# Patient Record
Sex: MALE | Race: WHITE
[De-identification: names, ages, dates, MRNs, and addresses within clinical notes are randomized per-mention and may not be internally consistent; named-entity substitution may affect disease eponyms.]

---

## 2017-07-25 ENCOUNTER — HOSPITAL ENCOUNTER (EMERGENCY)
Dept: HOSPITAL 74 - JER | Age: 37
Discharge: HOME | End: 2017-07-25
Payer: COMMERCIAL

## 2017-07-25 VITALS — TEMPERATURE: 98 F

## 2017-07-25 VITALS — HEART RATE: 72 BPM | DIASTOLIC BLOOD PRESSURE: 78 MMHG | SYSTOLIC BLOOD PRESSURE: 145 MMHG

## 2017-07-25 VITALS — BODY MASS INDEX: 27.2 KG/M2

## 2017-07-25 DIAGNOSIS — R10.11: Primary | ICD-10-CM

## 2017-07-25 DIAGNOSIS — R19.7: ICD-10-CM

## 2017-07-25 LAB
ALBUMIN SERPL-MCNC: 3.8 G/DL (ref 3.4–5)
ALP SERPL-CCNC: 63 U/L (ref 45–117)
ALT SERPL-CCNC: 38 U/L (ref 12–78)
ANION GAP SERPL CALC-SCNC: 6 MMOL/L (ref 8–16)
APPEARANCE UR: CLEAR
AST SERPL-CCNC: 38 U/L (ref 15–37)
BASOPHILS # BLD: 0.6 % (ref 0–2)
BILIRUB SERPL-MCNC: 0.4 MG/DL (ref 0.2–1)
BILIRUB UR STRIP.AUTO-MCNC: NEGATIVE MG/DL
CALCIUM SERPL-MCNC: 9.3 MG/DL (ref 8.5–10.1)
CO2 SERPL-SCNC: 28 MMOL/L (ref 21–32)
COLOR UR: YELLOW
CREAT SERPL-MCNC: 1.2 MG/DL (ref 0.7–1.3)
DEPRECATED RDW RBC AUTO: 13.2 % (ref 11.9–15.9)
EOSINOPHIL # BLD: 0.8 % (ref 0–4.5)
GLUCOSE SERPL-MCNC: 91 MG/DL (ref 74–106)
KETONES UR QL STRIP: NEGATIVE
LEUKOCYTE ESTERASE UR QL STRIP.AUTO: NEGATIVE
MAGNESIUM SERPL-MCNC: 2.1 MG/DL (ref 1.8–2.4)
MCH RBC QN AUTO: 31.6 PG (ref 25.7–33.7)
MCHC RBC AUTO-ENTMCNC: 34 G/DL (ref 32–35.9)
MCV RBC: 93 FL (ref 80–96)
MUCOUS THREADS URNS QL MICRO: (no result)
NEUTROPHILS # BLD: 63.8 % (ref 42.8–82.8)
NITRITE UR QL STRIP: NEGATIVE
PH UR: 6 [PH] (ref 5–8)
PLATELET # BLD AUTO: 235 K/MM3 (ref 134–434)
PMV BLD: 8.2 FL (ref 7.5–11.1)
PROT SERPL-MCNC: 7.6 G/DL (ref 6.4–8.2)
PROT UR QL STRIP: (no result)
PROT UR QL STRIP: NEGATIVE
RBC # BLD AUTO: <1 /HPF (ref 0–3)
RBC # UR STRIP: NEGATIVE /UL
SP GR UR: >= 1.03 (ref 1–1.02)
UROBILINOGEN UR STRIP-MCNC: 2 MG/DL (ref 0.2–1)
WBC # BLD AUTO: 6.2 K/MM3 (ref 4–10)
WBC # UR AUTO: <1 /HPF (ref 3–5)

## 2017-07-25 PROCEDURE — 3E0333Z INTRODUCTION OF ANTI-INFLAMMATORY INTO PERIPHERAL VEIN, PERCUTANEOUS APPROACH: ICD-10-PCS

## 2017-07-25 PROCEDURE — 3E033GC INTRODUCTION OF OTHER THERAPEUTIC SUBSTANCE INTO PERIPHERAL VEIN, PERCUTANEOUS APPROACH: ICD-10-PCS

## 2017-07-25 NOTE — PDOC
*Physical Exam





- Vital Signs


 Last Vital Signs











Temp Pulse Resp BP Pulse Ox


 


 98 F   88   18   155/85   98 


 


 07/25/17 11:55  07/25/17 11:55  07/25/17 11:55  07/25/17 11:55  07/25/17 11:55














ED Treatment Course





- LABORATORY


CBC & Chemistry Diagram: 


 07/25/17 13:00





 07/25/17 13:00





- Medications


Given in the ED: 


ED Medications














Discontinued Medications














Generic Name Dose Route Start Last Admin





  Trade Name Wallace  PRN Reason Stop Dose Admin


 


Pantoprazole Sodium 40 mg/  100 mls @ 200 mls/hr 07/25/17 12:41 07/25/17 13:19





  Sodium Chloride  IVPB 07/25/17 13:10  200 mls/hr





  ONCE ONE   Administration


 


Ketorolac Tromethamine  30 mg 07/25/17 12:41 07/25/17 13:19





  Toradol Injection -  IVPUSH 07/25/17 12:42  30 mg





  ONCE STA   Administration














Medical Decision Making





- Medical Decision Making





07/25/17 13:30


Patient seen and evaluated with the nurse practitioner. I agree with the 

overall evaluation, assessment, and management with the following summary of 

visit:





37-year-old male presents with abdominal discomfort and diarrhea.





Agree with exam, agree with workup including labs, IV fluids, antacids.


Reassess and dispo

## 2017-07-25 NOTE — PDOC
History of Present Illness





- General


Chief Complaint: Pain


Stated Complaint: ABD PAIN


Time Seen by Provider: 07/25/17 12:11


History Source: Patient


Exam Limitations: No Limitations





- History of Present Illness


Travel History: No


Initial Comments: 


07/25/17 12:57


37-year-old male presents to the ED with complaints of intermittent diarrhea 

for the past 2 days associated with epigastric cramping without fever, chills, 

nausea, weakness, dizziness, rectal bleeding, or back pain. Patient states 

symptoms are worsened after eating and denies history of diverticulitis, recent 

travel, recent illness. Pt states diarrhea has decreased over the past 24 hours 

but since still continues, he decided to seek medical care.








Timing/Duration: reports: intermittent


Quality: reports: moderate, cramping


Abdominal Pain Onset Location: reports: epigastric


Pain Radiation: reports: no radiation


Aggravating Factors: improves with: Eating


Alleviating Factors: improves with: None





Past History





- Travel


Traveled outside of the country in the last 30 days: No


Close contact w/someone who was outside of country & ill: No





- Past Medical History


Allergies/Adverse Reactions: 


 Allergies











Allergy/AdvReac Type Severity Reaction Status Date / Time


 


No Known Allergies Allergy   Verified 07/25/17 11:59











Home Medications: 


Ambulatory Orders





NK [No Known Home Medication]  05/01/16 








Suicide Attempt (Hx): No





- Immunization History


Immunization Up to Date: Yes





- Psycho/Social/Smoking Cessation Hx


Anxiety: No


Suicidal Ideation: No


Smoking History: Never smoked


Information on smoking cessation initiated: No


Hx Alcohol Use: No


Drug/Substance Use Hx: No


Substance Use Type: None


Patient Lives Alone: No


Lives with/in: spouse/SO





Abd/GI Specific PMHX





- Complaint Specific PMHX


Colitis: No


Diverticulitis: No


GERD: No


GI Ulcer Disease: No





**Review of Systems





- Review of Systems


Able to Perform ROS?: Yes


Constitutional: No: Symptoms Reported


HEENTM: No: Symptoms Reported


Respiratory: Yes: Cough, SOB with Exertion


Cardiac (ROS): No: Symptoms Reported, Chest Pain, Chest Tightness


ABD/GI: No: Symptoms Reported


Musculoskeletal: No: Symptoms Reported


Integumentary: No: Symptoms Reported


Neurological: No: Symptoms reported


Hematologic/Lymphatic: No: Symptoms Reported





*Physical Exam





- Vital Signs


 Last Vital Signs











Temp Pulse Resp BP Pulse Ox


 


 98 F   88   18   155/85   98 


 


 07/25/17 11:55  07/25/17 11:55  07/25/17 11:55  07/25/17 11:55  07/25/17 11:55














- Physical Exam


General Appearance: Yes: Nourished, Appropriately Dressed.  No: Apparent 

Distress


HEENT: negative: Pale Conjunctivae


Neck: positive: Supple


Respiratory/Chest: positive: Lungs Clear, Normal Breath Sounds.  negative: 

Respiratory Distress, Accessory Muscle Use


Cardiovascular: positive: Regular Rhythm, Regular Rate.  negative: Murmur


Gastrointestinal/Abdominal: positive: Soft, Tenderness (epigastric right upper 

quadrant. Negative [place])


Musculoskeletal: negative: CVA Tenderness


Integumentary: positive: Normal Color, Warm, Moist


Neurologic: positive: Motor Strength 5/5 (ambulatory)





ED Treatment Course





- LABORATORY


CBC & Chemistry Diagram: 


 07/25/17 13:00





 07/25/17 13:00





Medical Decision Making





- Medical Decision Making


07/25/17 13:08


Patient complains of intermittent diarrhea for the past 2 days aggravated with 

eating. Patient has no other complaints at this time except for mild abdominal 

cramping to the epigastric region. Patient on exam and tenderness to the 

epigastric and right upper quadrant area. Patient be ordered for labs including 

a lipase, ordered for Protonix IV fluids and Toradol. Patient also started 

drinking contrast if the need for CT will be required.





07/25/17 15:00


 Laboratory Tests











  07/25/17





  12:41


 


Urine Protein  2+ H


 


Urine Nitrite  Negative


 


Urine Bilirubin  Negative


 


Ur Leukocyte Esterase  Negative


 


Urine WBC  <1














07/25/17 15:01


 Laboratory Tests











  07/25/17 07/25/17





  13:00 13:00


 


WBC  6.2 


 


Hgb  14.9 


 


Hct  43.7 


 


Neutrophils %  63.8 


 


Sodium   141


 


Potassium   4.6


 


Chloride   107


 


Carbon Dioxide   28


 


Anion Gap   6 L


 


BUN   15


 


Creatinine   1.2


 


Random Glucose   91


 


Calcium   9.3


 


Magnesium   2.1


 


Total Bilirubin   0.4


 


AST   38 H


 


ALT   38


 


Lipase   157








Patient states feeling better after receiving the above medication. Patient be 

discharged home with recommendations to take over-the-counter Pepcid and to eat 

stool binding foods such as starches and bananas





*DC/Admit/Observation/Transfer


Diagnosis at time of Disposition: 


 Epigastric pain





Diarrhea


Qualifiers:


 Diarrhea type: unspecified type Qualified Code(s): R19.7 - Diarrhea, 

unspecified





- Discharge Dispostion


Disposition: HOME


Condition at time of disposition: Improved





- Referrals


Referrals: 


Caridad Portillo [Primary Care Provider] - 





- Patient Instructions


Printed Discharge Instructions:  DI for Diarrhea and Traveler's Diarrhea -- 

Adult, DI for Epigastric Pain


Additional Instructions: 


take over-the-counter Pepcid and to eat stool binding foods such as starches 

and bananas.

## 2018-02-20 ENCOUNTER — HOSPITAL ENCOUNTER (EMERGENCY)
Dept: HOSPITAL 74 - JERFT | Age: 38
Discharge: HOME | End: 2018-02-20
Payer: COMMERCIAL

## 2018-02-20 VITALS — DIASTOLIC BLOOD PRESSURE: 92 MMHG | HEART RATE: 74 BPM | SYSTOLIC BLOOD PRESSURE: 136 MMHG | TEMPERATURE: 97.9 F

## 2018-02-20 VITALS — BODY MASS INDEX: 27.2 KG/M2

## 2018-02-20 DIAGNOSIS — R51: Primary | ICD-10-CM

## 2018-02-20 PROCEDURE — 3E0233Z INTRODUCTION OF ANTI-INFLAMMATORY INTO MUSCLE, PERCUTANEOUS APPROACH: ICD-10-PCS | Performed by: NURSE PRACTITIONER

## 2018-02-20 NOTE — PDOC
Rapid Medical Evaluation


Chief Complaint: Headache


Time Seen by Provider: 02/20/18 16:49


Medical Evaluation: 


 Allergies











Allergy/AdvReac Type Severity Reaction Status Date / Time


 


No Known Allergies Allergy   Verified 02/20/18 16:49











02/20/18 16:50


I have performed a brief in-person evaluation of this patient.





The patient presents with a chief complaint of: frontal headache with diplopia 

x2 days- completely relieved with Fiorcet





Pertinent physical exam findings: VSS CN2-12 grossly intact





I have ordered the following: Toradol





The patient will proceed to the ED for further evaluation.





**Discharge Disposition





- Diagnosis


 Headache








- Referrals





- Patient Instructions





- Post Discharge Activity

## 2018-05-11 ENCOUNTER — HOSPITAL ENCOUNTER (EMERGENCY)
Dept: HOSPITAL 74 - JERFT | Age: 38
Discharge: HOME | End: 2018-05-11
Payer: COMMERCIAL

## 2018-05-11 VITALS — HEART RATE: 87 BPM | DIASTOLIC BLOOD PRESSURE: 80 MMHG | TEMPERATURE: 98.9 F | SYSTOLIC BLOOD PRESSURE: 104 MMHG

## 2018-05-11 VITALS — BODY MASS INDEX: 26.9 KG/M2

## 2018-05-11 DIAGNOSIS — J40: Primary | ICD-10-CM

## 2018-05-11 DIAGNOSIS — R05: ICD-10-CM

## 2018-05-11 DIAGNOSIS — R51: ICD-10-CM

## 2018-05-11 NOTE — PDOC
History of Present Illness





- General


Chief Complaint: Cold Symptoms


Stated Complaint: HEADACHES


Time Seen by Provider: 05/11/18 10:49





- History of Present Illness


Initial Comments: 


37-year-old male presents for evaluation of headache with associated cough. 

Going on the last 2 days. He has no fevers chills or night sweats nausea 

vomiting. He's had headaches like this in the past area he does have a history 

of migraines and this headache is typical of prior headaches.


05/11/18 12:06








Past History





- Past Medical History


Allergies/Adverse Reactions: 


 Allergies











Allergy/AdvReac Type Severity Reaction Status Date / Time


 


No Known Allergies Allergy   Verified 05/11/18 10:37











Home Medications: 


Ambulatory Orders





Acetaminophen/Caffeine/Butalb [Fioricet -] 1 tab PO TID PRN #15 tablet MDD 3 02/ 20/18 


Dextromethorphan HBr [Robitussin] 15 mg PO HS #20 ml 05/11/18 


Dextromethorphan HBr [Robitussin] 15 mg PO HS #20 ml 05/11/18 








CVA: No


COPD: No


DVT: No





- Immunization History


Immunization Up to Date: Yes





- Suicide/Smoking/Psychosocial Hx


Smoking History: Never smoked


Information on smoking cessation initiated: No


Hx Alcohol Use: No


Drug/Substance Use Hx: No


Substance Use Type: None





**Review of Systems





- Review of Systems


Comments:: 





05/11/18 12:07


GENERAL/CONSTITUTIONAL: [No fever or chills. No weakness. No weight change.]


HEAD, EYES, EARS, NOSE AND THROAT: [No change in vision. No ear pain or 

discharge. No sore throat.]


CARDIOVASCULAR: [No chest pain or shortness of breath.]


RESPIRATORY: [+ cough, NO wheezing, or hemoptysis.]


GASTROINTESTINAL: [No nausea, vomiting, diarrhea or constipation. No rectal 

bleeding.]


GENITOURINARY: [No dysuria, frequency, or change in urination.]


MUSCULOSKELETAL: [No joint or muscle swelling or pain. No neck or back pain.]


SKIN AND BREASTS: [No rash or easy bruising.]


NEUROLOGIC: [+ headache,  NO vertigo, loss of consciousness, or loss of 

sensation.]


PSYCHIATRIC: [No depression or anxiety.]


ENDOCRINE: [No increased thirst. No abnormal weight change.]


HEMATOLOGIC/LYMPHATIC: [No anemia, easy bleeding, or history of blood clots.]


ALLERGIC/IMMUNOLOGIC: [No hives or skin allergy. No latex allergy.]





*Physical Exam





- Vital Signs


 Last Vital Signs











Temp Pulse Resp BP Pulse Ox


 


 98.9 F   87   17   104/80   96 


 


 05/11/18 10:37  05/11/18 10:37  05/11/18 10:37  05/11/18 10:37  05/11/18 10:37














- Physical Exam


Comments: 





05/11/18 12:08


GENERAL: [The patient is awake, alert, and fully oriented, in no acute distress.

]


HEAD: [Normal with no signs of trauma.]


EYES: [Pupils equal, round and reactive to light, extraocular movements intact, 

sclera anicteric, conjunctiva clear.]


ENT: [Ears normal, nares patent, oropharynx clear without exudates.  Moist 

mucous membranes.]


NECK: [Normal range of motion, supple without lymphadenopathy, JVD, or masses.]


LUNGS: [Breath sounds equal, clear to auscultation bilaterally.  No wheezes, 

and no crackles.]


HEART: [Regular rate and rhythm, normal S1 and S2 without murmur, rub or gallop.

]


ABDOMEN: [Soft, nontender, normoactive bowel sounds.  No guarding, no rebound.  

No masses.]


EXTREMITIES: [Normal range of motion, no edema.  No clubbing or cyanosis. No 

cords, erythema, or tenderness.]


NEUROLOGICAL: [Cranial nerves II through XII grossly intact.  Normal speech, 

normal gait.]


PSYCH: [Normal mood, normal affect.]


SKIN: [Warm, Dry, normal turgor, no rashes or lesions noted.]





Medical Decision Making





- Medical Decision Making


This may be a viral bronchitis causing a headache with his cough or 

exacerbation of his pre-existing migraines. He has Fioricet at home which she 

takes. I will give him Robitussin for the cough and close follow-up with his PCP


05/11/18 12:08








*DC/Admit/Observation/Transfer


Diagnosis at time of Disposition: 


 Headache, Bronchitis, Cough








- Discharge Dispostion


Disposition: HOME


Condition at time of disposition: Stable


Decision to Admit order: No





- Referrals


Referrals: 


Caridad Portillo [Primary Care Provider] - 





- Patient Instructions


Printed Discharge Instructions:  DI for Viral Upper Respiratory Infection -- 

Adult


Additional Instructions: 


May continue headache medication as prescribed above also phoned in Robitussin 

for cough follow-up with her primary care provider next day or to return to the 

emergency room if symptoms worsen or go unresolved prior follow-up





- Post Discharge Activity

## 2018-06-15 ENCOUNTER — HOSPITAL ENCOUNTER (EMERGENCY)
Dept: HOSPITAL 74 - JERFT | Age: 38
Discharge: HOME | End: 2018-06-15
Payer: COMMERCIAL

## 2018-06-15 VITALS — TEMPERATURE: 97.9 F | DIASTOLIC BLOOD PRESSURE: 86 MMHG | HEART RATE: 81 BPM | SYSTOLIC BLOOD PRESSURE: 127 MMHG

## 2018-06-15 VITALS — BODY MASS INDEX: 27.4 KG/M2
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
## 2018-06-15 DIAGNOSIS — S76.812A: Primary | ICD-10-CM
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
## 2018-06-15 DIAGNOSIS — S63.681A: ICD-10-CM

## 2018-06-15 PROCEDURE — 2W3JX1Z IMMOBILIZATION OF RIGHT FINGER USING SPLINT: ICD-10-PCS

## 2018-06-15 PROCEDURE — 3E0233Z INTRODUCTION OF ANTI-INFLAMMATORY INTO MUSCLE, PERCUTANEOUS APPROACH: ICD-10-PCS

## 2018-06-15 NOTE — PDOC
History of Present Illness





- General


Chief Complaint: Pain, Acute


Stated Complaint: LEG PAIN


Time Seen by Provider: 06/15/18 09:53


History Source: Patient





- History of Present Illness


Occurred: reports: yesterday


Severity: Yes: moderate


Lower Extremity Pain Location: left: other (thigh and R thumb)


Method of Injury: Yes: sports injury





Past History





- Past Medical History


Allergies/Adverse Reactions: 


 Allergies











Allergy/AdvReac Type Severity Reaction Status Date / Time


 


No Known Allergies Allergy   Verified 06/15/18 09:42











Home Medications: 


Ambulatory Orders





Cyclobenzaprine HCl [Flexeril -] 10 mg PO TID #9 tablet 06/15/18 


Ibuprofen [Motrin -] 2 tab PO Q6H #30 tablet 06/15/18 








CVA: No


COPD: No


DVT: No





- Immunization History


Immunization Up to Date: Yes





- Suicide/Smoking/Psychosocial Hx


Smoking History: Never smoked


Information on smoking cessation initiated: No


Hx Alcohol Use: No


Drug/Substance Use Hx: No


Substance Use Type: None





**Review of Systems





- Review of Systems


Musculoskeletal: Yes: Joint Pain, Joint Swelling





*Physical Exam





- Vital Signs


 Last Vital Signs











Temp Pulse Resp BP Pulse Ox


 


 97.9 F   81   16   127/86   96 


 


 06/15/18 09:43  06/15/18 09:43  06/15/18 09:43  06/15/18 09:43  06/15/18 09:43














- Physical Exam


General Appearance: Yes: Appropriately Dressed, Moderate Distress


HEENT: positive: Normal Voice


Neck: positive: Supple


Respiratory/Chest: negative: Respiratory Distress


Musculoskeletal: positive: Other (ttp to posterior L thigh, no swelling, distal 

phalanx of R thumb w/ swelling, erythema and ttp)


Integumentary: positive: Dry, Warm


Neurologic: positive: Fully Oriented, Alert, Normal Mood/Affect





ED Treatment Course





- RADIOLOGY


Radiology Studies Ordered: 














 Category Date Time Status


 


 FEMUR-LEFT [RAD] [facility]  06/15/18 10:57 Ordered


 


 FINGER(S) RIGHT [RAD] [facility]  06/15/18 10:59 Ordered














- Medications


Given in the ED: 


ED Medications














Discontinued Medications














Generic Name Dose Route Start Last Admin





  Trade Name Freq  PRN Reason Stop Dose Admin


 


Ketorolac Tromethamine  60 mg  06/15/18 10:58  06/15/18 11:02





  Toradol Injection -  IM  06/15/18 10:59  60 mg





  ONCE ONE   Administration





     





     





     





     














Medical Decision Making





- Medical Decision Making





06/15/18 11:13


37-year-old male, no significant history, presents with multiple injuries 

status post basketball yesterday.  Patient complaining of pain to posterior 

aspect of left thigh which started after landing jumpimg in air and landing 

hard on LLE. Suspects might be his hamstring.  Pain sharp and constant and 

worse with weight bearing.  Has not taken anything for pain but wearing an ace 

wrap to extremity. Also complaining of R thumb pain and swelling after a ball 

bounced off finger.





See exam





LLE pain s/p sports injury


M/l strain


-XR 2/2 degree of pain


-pain meds in ED/reassess





R thumb injury


R/o fx


-xray





06/15/18 11:44


Left femur negative for fracture.  Right thumb film with possible minor non-

displaced fracture to base of distal phalanx.  Finger splint paced and hand 

follow-up given.  Patient better with meds in ED, will discharge with pain 

control








*DC/Admit/Observation/Transfer


Diagnosis at time of Disposition: 


Muscle strain of thigh


Qualifiers:


 Encounter type: initial encounter Laterality: left Qualified Code(s): S76.912A 

- Strain of unspecified muscles, fascia and tendons at thigh level, left thigh, 

initial encounter





Thumb sprain


Qualifiers:


 Sprain of finger site: other site Laterality: right 








- Discharge Dispostion


Disposition: HOME


Condition at time of disposition: Improved





- Prescriptions


Prescriptions: 


Cyclobenzaprine HCl [Flexeril -] 10 mg PO TID #9 tablet


Ibuprofen [Motrin -] 2 tab PO Q6H #30 tablet





- Referrals


Referrals: 


Caridad Portillo [Primary Care Provider] - 


Karl Louis MD [Staff Physician] - 





- Patient Instructions


Printed Discharge Instructions:  DI for Finger Sprain, DI for Hamstring Strain


Additional Instructions: 


X-ray of your left thigh did not show a fracture.  There is a possible very 

minor fracture in your thumb.  Treatment is with a splint for comfort and 

Motrin or Tylenol for pain as needed.  You can follow up for reevaluation in 2 

weeks with an orthopedic doctor.


Take pain medication as directed





- Post Discharge Activity


Forms/Work/School Notes:  Back to Work

## 2018-07-14 ENCOUNTER — HOSPITAL ENCOUNTER (EMERGENCY)
Dept: HOSPITAL 74 - JERFT | Age: 38
Discharge: HOME | End: 2018-07-14
Payer: COMMERCIAL

## 2018-07-14 VITALS — DIASTOLIC BLOOD PRESSURE: 91 MMHG | HEART RATE: 74 BPM | SYSTOLIC BLOOD PRESSURE: 135 MMHG | TEMPERATURE: 98.5 F

## 2018-07-14 VITALS — BODY MASS INDEX: 28 KG/M2

## 2018-07-14 DIAGNOSIS — S62.655A: Primary | ICD-10-CM
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
## 2018-07-14 DIAGNOSIS — S63.591A: ICD-10-CM

## 2018-07-14 NOTE — PDOC
History of Present Illness





- General


Chief Complaint: Injury


Stated Complaint: PAIN





- History of Present Illness


Initial Comments: 


2-year-old male with a past medical history of hypertension which he takes a 

medication he cannot remember presents for evaluation of left fourth finger 

pain and right wrist pain 2 weeks. He first noticed pain after playing 

basketball he states he got jam with a basketball and hand and wrist and has 

been


07/14/18 19:26








Past History





- Past Medical History


Allergies/Adverse Reactions: 


 Allergies











Allergy/AdvReac Type Severity Reaction Status Date / Time


 


No Known Allergies Allergy   Verified 07/14/18 19:01











COPD: No


HTN: Yes





- Suicide/Smoking/Psychosocial Hx


Smoking History: Never smoked





**Review of Systems





- Review of Systems


Musculoskeletal: Yes: See HPI, Joint Pain


All Other Systems: Reviewed and Negative





*Physical Exam





- Vital Signs


 Last Vital Signs











Temp Pulse Resp BP Pulse Ox


 


 98.5 F   74   18   135/91   99 


 


 07/14/18 18:58  07/14/18 18:58  07/14/18 18:58  07/14/18 18:58  07/14/18 18:58














- Physical Exam


Comments: 


Right wrist skin color and temperature are normal there is tenderness about the 

ulnar aspect of the wrist. He has pain at terminal supination. 5 out of 5  

strength. No evidence of instability no tenderness about the scaphoid no 

tenderness of the distal radius or radial styloid he has no gross sensorimotor 

deficits she's neurovascular intact.





Left fifth finger is swollen at the PIPJ. He has tenderness about the PIPJ with 

mild swelling. He is unable to flex his DIPJ independently. He flexes his PIPJ. 

Unable to determine if FDP is intact. The remainder of the hand is normal he 

has no gross sensorimotor deficits in the remainder of the fingers. He has full 

sensation the fourth finger


07/14/18 19:27








ED Treatment Course





- RADIOLOGY


Radiology Studies Ordered: 














 Category Date Time Status


 


 HAND- LEFT [RAD] [facility]  07/14/18 19:25 Ordered


 


 WRIST W/HAND-RIGHT* [RAD] [facility]  07/14/18 19:24 Ordered














Medical Decision Making





- Medical Decision Making


Right wrist x-ray is negative TFCC tear is likely I'll have him follow-up with 

hand and wrist surgery there is a small fracture only seen on lateral at the 

base of the middle phalanx of the left fourth finger for this he can use jeffrey 

tape. Fractures now 2 weeks old at this point.Am unable to determine if his FDP 

is intact I will have her follow-up for hand surgery


07/14/18 19:55








*DC/Admit/Observation/Transfer


Diagnosis at time of Disposition: 


 Fracture of finger of left hand, Right wrist sprain








- Discharge Dispostion


Disposition: HOME


Condition at time of disposition: Stable


Decision to Admit order: No





- Referrals


Referrals: 


Caridad Portillo [Primary Care Provider] - 


Arsen Oswald MD [Staff Physician] - 





- Patient Instructions


Printed Discharge Instructions:  Finger Fracture, DI for Finger Fracture, Wrist 

Sprain, DI for Wrist Sprain


Additional Instructions: 


Return to the emergency room should her symptoms worsen or go unresolved. Take 

Tylenol for pain. Follow-up with hand surgery in 1-2 days for further 

evaluation and treatment options. Keep your fingers jeffrey taped and wear the 

wrist splint for comfort. Do not neglect following up with hand surgery I feel 

he may have torn attended and your fourth finger of your left hand. This may 

require operative repair.





- Post Discharge Activity

## 2018-10-11 ENCOUNTER — HOSPITAL ENCOUNTER (EMERGENCY)
Dept: HOSPITAL 74 - JERFT | Age: 38
Discharge: HOME | End: 2018-10-11
Payer: COMMERCIAL

## 2018-10-11 VITALS — SYSTOLIC BLOOD PRESSURE: 129 MMHG | DIASTOLIC BLOOD PRESSURE: 87 MMHG | TEMPERATURE: 98.6 F | HEART RATE: 80 BPM

## 2018-10-11 VITALS — BODY MASS INDEX: 27.1 KG/M2

## 2018-10-11 DIAGNOSIS — S90.112A: Primary | ICD-10-CM
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
## 2018-10-11 DIAGNOSIS — I10: ICD-10-CM
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
## 2018-10-11 NOTE — PDOC
Rapid Medical Evaluation


Time Seen by Provider: 10/11/18 17:18


Medical Evaluation: 


 Allergies











Allergy/AdvReac Type Severity Reaction Status Date / Time


 


No Known Allergies Allergy   Verified 10/11/18 17:13











10/11/18 17:19


The patient complaints of: 1st toe pain after rotor fell on toe, no diabetes 


On brief exam: noted mild edema and LROM to left 1st toe


The patient was ordered for: xray ordered


The patient will proceed to the ED





**Discharge Disposition





- Diagnosis


 Toe injury








- Referrals





- Patient Instructions





- Post Discharge Activity

## 2018-10-11 NOTE — PDOC
History of Present Illness





- General


Chief Complaint: Injury


Stated Complaint: LT FOOT PAIN


Time Seen by Provider: 10/11/18 17:18


History Source: Patient


Exam Limitations: No Limitations





- History of Present Illness


Initial Comments: 





10/11/18 18:57





HISTORY OF PRESENT ILLNESS: This is a 38-year-old male without past medical 

history who presents emergency departments with left great toe pain status post 

direct trauma. Patient was carrying break rotors when the weight shifted 

causing him to adjust the way he was carrying them when one of the rotors fell 

out of the box striking him on the left great toe.





No recent travel or sick contacts. 


PAST MEDICAL HISTORY: Denies past medical history


SURGICAL HISTORY: Denies


ALLERGIES: No known drug allergies





REVIEW OF SYSTEMS


General/Constitutional: Denies fever or chills. Denies weakness, weight change.


HEENT: Denies change in vision. Denies ear pain or discharge. Denies sore 

throat.


Cardiovascular: Denies chest pain or shortness of breath.


Respiratory: Denies cough, wheezing, or hemoptysis.


Gastrointestinal: Denies nausea, vomiting, diarrhea or constipation. Denies 

rectal bleeding.


Genitourinary: Denies dysuria, frequency, or change in urination.


Musculoskeletal: Left great toe pain.


Skin and breasts: Denies rash or easy bruising.


Neurologic: Denies headache, vertigo, loss of consciousness, or loss of 

sensation.


Psychiatric: Denies depression or anxiety.


Endocrine: Denies increased thirst. Denies abnormal weight change.


Hematologic/Lymphatic: Denies anemia, easy bleeding, or history of blood clots.


Allergic/Immunologic: Denies hives or skin allergy. Denies latex allergy.











PHYSICAL EXAM


General Appearance: Well-appearing, appropriately dressed.  No apparent distress

, no intoxication.


HEENT: EOMI, PERRLA, normal ENT inspection, normal voice, TMs normal, pharynx 

normal.  No conjunctival pallor.  No photophobia, scleral icterus.


Neck: Supple.  Trachea midline. No tenderness, rigidity, carotid bruit, stridor

, lymphadenopathy, or thyromegaly. 


Respiratory/Chest: Lungs CTAB.  No shortness of breath, chest tenderness, 

respiratory distress, accessory muscle use. No crackles, rales, rhonchi, stridor

, wheezing, dullness


Cardiovascular: RRR. S1, S2.  No JVD, murmur, bradycardia, tachycardia.


Vascular Pulses: Dorsalis-Pedis (R): 2+, Dorsalis-Pedis (L): 2+


Gastrointestinal/Abdominal: Normal bowel sounds. Abdomen soft, non-distended.  

No tenderness or rebound tenderness. No organomegaly, pulsatile mass, guarding, 

hernia, hepatomegaly, splenomegaly.


Lymphatic: No adenopathy, tenderness.


Musculoskeletal/Extremities:  Normal inspection. FROM of all extremities, 

normal capillary refill.  Pelvis Stable.  No CVA tenderness. Tenderness to the 

distal phalanx of the left great toe. Capillary refill less than 2 seconds. No 

subungual hematoma present. Able to flex and extend toe against resistance.


Integumentary: Appropriate color, dry, warm.  No cyanosis, erythema, jaundice 

or rash


Neurologic: CNs II-XII intact. Fully oriented, alert.  Appropriate mood/affect. 

Motor strength 5/5.  No appreciable EOM palsy, facial droop or sensory deficit.








Past History





- Past Medical History


Allergies/Adverse Reactions: 


 Allergies











Allergy/AdvReac Type Severity Reaction Status Date / Time


 


No Known Allergies Allergy   Verified 10/11/18 17:13











Home Medications: 


Ambulatory Orders





NK [No Known Home Medication]  10/11/18 








CVA: No


COPD: No


DVT: No


HTN: Yes





- Immunization History


Immunization Up to Date: Yes





- Suicide/Smoking/Psychosocial Hx


Smoking History: Never smoked


Hx Alcohol Use: No


Drug/Substance Use Hx: No


Substance Use Type: None





*Physical Exam





- Vital Signs


 Last Vital Signs











Temp Pulse Resp BP Pulse Ox


 


 98.6 F   80   18   129/87   97 


 


 10/11/18 17:18  10/11/18 17:18  10/11/18 17:18  10/11/18 17:18  10/11/18 17:18














Medical Decision Making





- Medical Decision Making





10/11/18 18:59


A/P:


38-year-old male with left great toe pain status post direct trauma





Tender to palpation of the distal phalanx of the left great toe


No subungual hematoma present


Capillary refill less than 2 seconds


Full sensation noted to the distal phalanx of the left great toe





X-rays as read by me: No acute fractures present


Motrin 800 mg orally now


Discharge home





*DC/Admit/Observation/Transfer


Diagnosis at time of Disposition: 


Contusion of great toe of left foot


Qualifiers:


 Encounter type: initial encounter Damage to nail status: without damage 

Qualified Code(s): S90.112A - Contusion of left great toe without damage to nail

, initial encounter








- Discharge Dispostion


Disposition: HOME


Condition at time of disposition: Stable


Decision to Admit order: No





- Referrals


Referrals: 


Caridad Portillo [Primary Care Provider] - 





- Patient Instructions


Additional Instructions: 


Take Tylenol or Motrin as needed for pain. Follow manufacture's instructions 

for appropriate dosage.


You toes going to hurt for the next 7-10 days.


Apply ice to help control pain.


Return to emergency department for any concerns.





- Post Discharge Activity

## 2018-12-18 ENCOUNTER — HOSPITAL ENCOUNTER (EMERGENCY)
Dept: HOSPITAL 74 - JERFT | Age: 38
Discharge: HOME | End: 2018-12-18
Payer: COMMERCIAL

## 2018-12-18 VITALS — SYSTOLIC BLOOD PRESSURE: 146 MMHG | HEART RATE: 80 BPM | DIASTOLIC BLOOD PRESSURE: 105 MMHG | TEMPERATURE: 98.2 F

## 2018-12-18 VITALS — BODY MASS INDEX: 27.6 KG/M2

## 2018-12-18 DIAGNOSIS — B97.89: ICD-10-CM

## 2018-12-18 DIAGNOSIS — I10: ICD-10-CM

## 2018-12-18 DIAGNOSIS — J02.9: Primary | ICD-10-CM

## 2018-12-18 NOTE — PDOC
History of Present Illness





- General


Chief Complaint: Sore Throat


Stated Complaint: Sore Throat


Time Seen by Provider: 12/18/18 19:10





- History of Present Illness


Initial Comments: 





12/18/18 19:24


28-year-old male with past medical history significant for hypertension 

presents for evaluation of sore throat 4 days after recent travel without 

systemic symptoms





Past History





- Past Medical History


Allergies/Adverse Reactions: 


 Allergies











Allergy/AdvReac Type Severity Reaction Status Date / Time


 


No Known Allergies Allergy   Verified 12/18/18 18:41











Home Medications: 


Ambulatory Orders





Unobtainable  12/18/18 








CVA: No


COPD: No


DVT: No


HTN: Yes





- Immunization History


Immunization Up to Date: Yes





- Suicide/Smoking/Psychosocial Hx


Smoking History: Never smoked


Have you smoked in the past 12 months: No


Hx Alcohol Use: No


Drug/Substance Use Hx: No


Substance Use Type: None





**Review of Systems





- Review of Systems


Constitutional: No: Fever


HEENTM: Yes: Throat Pain





*Physical Exam





- Vital Signs


 Last Vital Signs











Temp Pulse Resp BP Pulse Ox


 


 98.2 F   80   18   146/105 H  95 


 


 12/18/18 18:41  12/18/18 18:41  12/18/18 18:41  12/18/18 18:41  12/18/18 18:41














- Physical Exam


Comments: 





12/18/18 19:25


HEAD: NC/AT


EYES: Conjuntiva clear


Ears: Canals and TM's normal


NOSE: No d/c


THROAT: Moist mucous membrances, oral pharanx clear, uvula midline


NECK: Supple without adenopathy


CARDIAC: S1 S2


LUNGS: CTA Full and Equal breath sounds


ABDOMEN: Soft NT ND


MS: Full ROM in all joints without edema 


NEUROLOGIC: No gross sensory or motor deficits, NVID


SKIN: Normal color and temperature no lesions or rashes





Moderate Sedation





- Procedure Monitoring


Vital Signs: 


Procedure Monitoring Vital Signs











Temperature  98.2 F   12/18/18 18:41


 


Pulse Rate  80   12/18/18 18:41


 


Respiratory Rate  18   12/18/18 18:41


 


Blood Pressure  146/105 H  12/18/18 18:41


 


O2 Sat by Pulse Oximetry (%)  95   12/18/18 18:41











*DC/Admit/Observation/Transfer


Diagnosis at time of Disposition: 


 Sore throat, Viral pharyngitis








- Discharge Dispostion


Disposition: HOME


Condition at time of disposition: Stable


Decision to Admit order: No





- Referrals


Referrals: 


Caridad Portillo [Primary Care Provider] - 





- Patient Instructions


Printed Discharge Instructions:  Viral Pharyngitis, DI for Viral Pharyngitis


Additional Instructions: 


Warm salt water gargles 5-6 times a day, Tylenol and Motrin for pain as 

directed follow up with her primary care physician in one to 2 days for further 

evaluation and treatment options and return to the emergency room should 

symptoms worsen or go unresolved. Your strep today was negative and U do not 

require antibiotics culture was sent.





- Post Discharge Activity

## 2018-12-18 NOTE — PDOC
Rapid Medical Evaluation


Chief Complaint: Sore Throat


Medical Evaluation: 


 Allergies











Allergy/AdvReac Type Severity Reaction Status Date / Time


 


No Known Allergies Allergy   Verified 12/18/18 18:41











12/18/18 18:42


I have performed a brief in-person evaluation of this patient.


The patient presents with a chief complaint of: sorethroat , no fevers, no 

coughs. 


Pertinent physical exam findings: throaot mild red/ no exudates 


I have ordered the following: rapid strep 


The patient will proceed to the ED for further evaluation.


12/18/18 18:45








**Discharge Disposition





- Diagnosis


 Sore throat








- Referrals


Referrals: 


Caridad Portillo [Primary Care Provider] - 





- Patient Instructions





- Post Discharge Activity

## 2018-12-26 ENCOUNTER — HOSPITAL ENCOUNTER (EMERGENCY)
Dept: HOSPITAL 74 - JER | Age: 38
Discharge: HOME | End: 2018-12-26
Payer: COMMERCIAL

## 2018-12-26 VITALS — TEMPERATURE: 97.9 F | HEART RATE: 77 BPM | SYSTOLIC BLOOD PRESSURE: 130 MMHG | DIASTOLIC BLOOD PRESSURE: 97 MMHG

## 2018-12-26 VITALS — BODY MASS INDEX: 27.4 KG/M2

## 2018-12-26 DIAGNOSIS — B97.89: ICD-10-CM

## 2018-12-26 DIAGNOSIS — J06.9: Primary | ICD-10-CM

## 2018-12-26 NOTE — PDOC
History of Present Illness





- General


Stated Complaint: SORE THROAT, BODYACHES


Time Seen by Provider: 12/26/18 12:25





- History of Present Illness


Initial Comments: 





12/26/18 12:31


38-year-old male presents for evaluation of general malaise and sore throat 

times one day





Past History





- Past Medical History


Allergies/Adverse Reactions: 


 Allergies











Allergy/AdvReac Type Severity Reaction Status Date / Time


 


No Known Allergies Allergy   Verified 12/26/18 12:03











Home Medications: 


Ambulatory Orders





NK [No Known Home Medication]  12/26/18 








CVA: No


COPD: No


DVT: No


HTN: Yes





- Immunization History


Immunization Up to Date: Yes





- Suicide/Smoking/Psychosocial Hx


Smoking History: Unknown if ever smoked


Have you smoked in the past 12 months: No


Information on smoking cessation initiated: No


Hx Alcohol Use: No


Drug/Substance Use Hx: No


Substance Use Type: None





**Review of Systems





- Review of Systems


Constitutional: Yes: Chills, Malaise, Night Sweats, Weakness.  No: Fever


HEENTM: Yes: Throat Pain


Respiratory: No: Cough





*Physical Exam





- Vital Signs


 Last Vital Signs











Temp Pulse Resp BP Pulse Ox


 


 97.9 F   77   16   130/97   99 


 


 12/26/18 11:49  12/26/18 11:49  12/26/18 11:49  12/26/18 11:49  12/26/18 11:49














- Physical Exam


Comments: 





12/26/18 12:32


HEAD: NC/AT


EYES: Conjuntiva clear


Ears: Canals and TM's normal


NOSE: No d/c


THROAT: Moist mucous membrances, oral pharanx clear, uvula midline


NECK: Supple without adenopathy


CARDIAC: S1 S2


LUNGS: CTA Full and Equal breath sounds


ABDOMEN: Soft NT ND


MS: Full ROM in all joints without edema 


NEUROLOGIC: No gross sensory or motor deficits, NVID


SKIN: Normal color and temperature no lesions or rashes





Moderate Sedation





- Procedure Monitoring


Vital Signs: 


Procedure Monitoring Vital Signs











Temperature  97.9 F   12/26/18 11:49


 


Pulse Rate  77   12/26/18 11:49


 


Respiratory Rate  16   12/26/18 11:49


 


Blood Pressure  130/97   12/26/18 11:49


 


O2 Sat by Pulse Oximetry (%)  99   12/26/18 11:49











*DC/Admit/Observation/Transfer


Diagnosis at time of Disposition: 


 Upper respiratory infection








- Discharge Dispostion


Disposition: HOME


Condition at time of disposition: Stable


Decision to Admit order: No





- Referrals


Referrals: 


Izabel Henson MD [Staff Physician] - 





- Patient Instructions


Printed Discharge Instructions:  DI for Viral Upper Respiratory Infection -- 

Adult


Additional Instructions: 


Flu swab today was negative. He may take Tylenol and Motrin as directed for any 

discomfort. Return to the emergency room should symptoms worsen or go 

unresolved and follow-up with your primary care physician in one to 2 days for 

further evaluation and treatment options.





- Post Discharge Activity

## 2019-02-04 ENCOUNTER — HOSPITAL ENCOUNTER (EMERGENCY)
Dept: HOSPITAL 74 - JERFT | Age: 39
Discharge: HOME | End: 2019-02-04
Payer: COMMERCIAL

## 2019-02-27 ENCOUNTER — HOSPITAL ENCOUNTER (EMERGENCY)
Dept: HOSPITAL 74 - JERFT | Age: 39
Discharge: HOME | End: 2019-02-27
Payer: COMMERCIAL

## 2019-02-27 VITALS — DIASTOLIC BLOOD PRESSURE: 110 MMHG | SYSTOLIC BLOOD PRESSURE: 150 MMHG | HEART RATE: 91 BPM | TEMPERATURE: 98.1 F

## 2019-02-27 VITALS — BODY MASS INDEX: 60.5 KG/M2

## 2019-02-27 DIAGNOSIS — S99.911A: Primary | ICD-10-CM

## 2019-02-27 DIAGNOSIS — I10: ICD-10-CM
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
## 2019-02-27 DIAGNOSIS — M25.571: ICD-10-CM
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
## 2019-02-27 PROCEDURE — 2W3QX1Z IMMOBILIZATION OF RIGHT LOWER LEG USING SPLINT: ICD-10-PCS

## 2019-02-27 NOTE — PDOC
Rapid Medical Evaluation


Time Seen by Provider: 02/27/19 21:31


Medical Evaluation: 


 Allergies











Allergy/AdvReac Type Severity Reaction Status Date / Time


 


No Known Allergies Allergy   Verified 02/04/19 20:21











02/27/19 21:32


I have performed a brief in-person evaluation of this patient.





The patient presents with a chief complaint of:R ankle injury after "twisting it

" this am per pt. Denies fall. has been limping since





Pertinent physical exam findings:Defer to FT provider





I have ordered the following:xray





The patient will proceed to the ED for further evaluation.





**Discharge Disposition





- Diagnosis


Right ankle injury


Qualifiers:


 Encounter type: initial encounter Qualified Code(s): S99.911A - Unspecified 

injury of right ankle, initial encounter








- Referrals





- Patient Instructions





- Post Discharge Activity

## 2019-02-27 NOTE — PDOC
History of Present Illness





- General


Chief Complaint: Pain


Stated Complaint: ANCLE PAIN


Time Seen by Provider: 02/27/19 21:31





- History of Present Illness


Initial Comments: 





02/27/19 22:10


38-year-old male with a past medical history significant for hypertension 

presents for evaluation of right ankle pain. He describes an inversion injury 

which occurred this morning while getting his daughter ready for school.





Past History





- Past Medical History


Allergies/Adverse Reactions: 


 Allergies











Allergy/AdvReac Type Severity Reaction Status Date / Time


 


No Known Allergies Allergy   Verified 02/04/19 20:21











Home Medications: 


Ambulatory Orders





Unobtainable  02/04/19 








CVA: No


COPD: No


DVT: No


HTN: Yes





- Immunization History


Immunization Up to Date: Yes





- Suicide/Smoking/Psychosocial Hx


Smoking History: Never smoked


Have you smoked in the past 12 months: No


Information on smoking cessation initiated: No


Hx Alcohol Use: No


Drug/Substance Use Hx: No


Substance Use Type: None





**Review of Systems





- Review of Systems


Musculoskeletal: Yes: Joint Pain





*Physical Exam





- Vital Signs


 Last Vital Signs











Temp Pulse Resp BP Pulse Ox


 


 98.1 F   91 H  20   150/110 H  100 


 


 02/27/19 21:34  02/27/19 21:34  02/27/19 21:34  02/27/19 21:34  02/27/19 21:34














- Physical Exam


Comments: 





02/27/19 22:10


Right ankle skin color and temperature are normal. There is no swelling. There 

is full range of motion of the ankle knee and foot as well as the toes. There 

is no tenderness about the knee proximal fibula or along its distal coarse. No 

tenderness about the medial or lateral malleolus base of the fifth metatarsal 

or navicular. Mild tenderness over the ATFL. Mild tenderness over the peroneal 

tendon. Neurovascularly intact, no instability no gross sensorimotor deficits.





Moderate Sedation





- Procedure Monitoring


Vital Signs: 


Procedure Monitoring Vital Signs











Temperature  98.1 F   02/27/19 21:34


 


Pulse Rate  91 H  02/27/19 21:34


 


Respiratory Rate  20   02/27/19 21:34


 


Blood Pressure  150/110 H  02/27/19 21:34


 


O2 Sat by Pulse Oximetry (%)  100   02/27/19 21:34











Medical Decision Making





- Medical Decision Making





02/27/19 22:12


X-ray show no evidence of fracture trauma or destructive process.





*DC/Admit/Observation/Transfer


Diagnosis at time of Disposition: 


 Ankle sprain, Right foot strain





Right ankle injury


Qualifiers:


 Encounter type: initial encounter Qualified Code(s): S99.911A - Unspecified 

injury of right ankle, initial encounter








- Discharge Dispostion


Disposition: HOME


Condition at time of disposition: Stable


Decision to Admit order: No





- Referrals


Referrals: 


Caridad Portillo [Primary Care Provider] - 


Angel Glaser DO [Staff Physician] - 





- Patient Instructions


Printed Discharge Instructions:  Ankle Sprain, DI for Ankle Sprain


Additional Instructions: 


Tylenol as directed for pain. He may weight-bear as tolerated with use of 

crutches and the Aircast. Please follow-up with orthopedic surgery in 1-2 days 

for further evaluation and treatment options and return to the emergency room 

should symptoms worsen.





- Post Discharge Activity

## 2019-12-15 ENCOUNTER — HOSPITAL ENCOUNTER (EMERGENCY)
Dept: HOSPITAL 74 - JER | Age: 39
Discharge: HOME | End: 2019-12-15
Payer: COMMERCIAL

## 2019-12-15 VITALS — TEMPERATURE: 98.3 F | HEART RATE: 90 BPM | SYSTOLIC BLOOD PRESSURE: 119 MMHG | DIASTOLIC BLOOD PRESSURE: 63 MMHG

## 2019-12-15 VITALS — BODY MASS INDEX: 27.4 KG/M2
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
## 2019-12-15 DIAGNOSIS — I10: ICD-10-CM

## 2019-12-15 DIAGNOSIS — S62.605A: Primary | ICD-10-CM

## 2019-12-15 NOTE — PDOC
History of Present Illness





- General


Chief Complaint: Pain, Acute


Stated Complaint: FINGER PAIN


Time Seen by Provider: 12/15/19 03:02


History Source: Patient


Exam Limitations: No Limitations





- History of Present Illness


Initial Comments: 





12/15/19 03:52





Mr. Aleksander Miramontes is a right-hand-dominant 39-year-old male who presents 

emergency department with a complaint of pain to his ring finger.


Briefly, patient sustained an injury earlier this year for which he had an 

operative procedure by a hand surgeon from [facility].


Patient states subsequent to that surgery he has had flexion of the PIP.


He was off from work for approximately 4 months after his surgery in June and 

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 

demolition work, he has noted severe pain


Per patient, his work up thus far has been unrevealing


Patient states that this evening he was carrying a heavy box and suddenly 

developed pain in his finger.


He had to drop the box.


No direct trauma to the finger


No puncture wounds 


No skin changes, no fevers or chills





PMH: denies


PSH: left ring finger surgery


Meds: NKDA


ALL: NKDA


Social: 








ROS:


GENERAL/CONSTITUTIONAL: No: fever, chills, weakness, loss of appetite.


HEAD, EYES, EARS, NOSE AND THROAT: No: change in vision, ear pain, discharge, 

sore throat, throat swelling.


CARDIOVASCULAR: No: chest pain, lightheadedness, palpitations, syncope


RESPIRATORY: No: cough, shortness of breath, wheezing, hemoptysis, stridor.


GASTROINTESTINAL: No: nausea, vomiting, diarrhea, abdominal cramping, rectal 

bleeding, constipation. 


GENITOURINARY: No: dysuria, hematuria, frequency, urgency, flank pain.


MUSCULOSKELETAL: Yes: ring finger tender to palpaion No: swelling  


SKIN: No: rash, laceration  


NEUROLOGIC: No: headache, vertigo, paresthesias, weakness 


 








PE:


GENERAL: The patient is in no acute distress.


HEAD: Normal with no signs of trauma.


EYES: PERRLA, EOMI, sclera anicteric, conjunctiva clear.


ENT: Ears normal, nares patent, oropharynx clear without exudates.  Moist 

mucous membranes.


NECK: Normal range of motion, supple without lymphadenopathy, JVD, or masses.


LUNGS: Breath sounds equal, clear to auscultation bilaterally.  No wheezes, and 

no crackles.


HEART:Regular rate and rhythm, normal S1 and S2 without murmur, rub or gallop.


ABDOMEN: Soft, nontender, normoactive bowel sounds.  No guarding, no rebound.  

No masses palpable.


EXTREMITIES: left ring finger flexed at PIP, limited extension (stable from 

prior)


tender throughout the finger


sensation in tact


No cyanosis. 


NEUROLOGICAL: Cranial nerves II through XII grossly intact.  Normal speech.  No 

focal neurological deficits. 


MUSCULOSKELETAL:  Back non-tender to palpation 


SKIN: No laceration, rash bruising





Past History





- Past Medical History


Allergies/Adverse Reactions: 


 Allergies











Allergy/AdvReac Type Severity Reaction Status Date / Time


 


No Known Allergies Allergy   Verified 12/15/19 01:30











Home Medications: 


Ambulatory Orders





traMADol HCL [Ultram] 50 mg PO BID PRN #6 tablet MDD 2 12/15/19 








CVA: No


COPD: No


DVT: No


HTN: Yes





- Immunization History


Immunization Up to Date: Yes





- Psycho Social/Smoking Cessation Hx


Smoking History: Never smoked


Have you smoked in the past 12 months: No


Information on smoking cessation initiated: No


Hx Alcohol Use: No


Drug/Substance Use Hx: No


Substance Use Type: None





*Physical Exam





- Vital Signs


 Last Vital Signs











Temp Pulse Resp BP Pulse Ox


 


 98.3 F   90   20   119/63   95 


 


 12/15/19 01:26  12/15/19 01:26  12/15/19 01:26  12/15/19 01:26  12/15/19 01:26














ED Treatment Course





- RADIOLOGY


Radiology Studies Ordered: 














 Category Date Time Status


 


 FINGER(S) LEFT [RAD] Stat Radiology  12/15/19 03:10 Ordered














- Medications


Given in the ED: 


ED Medications














Discontinued Medications














Generic Name Dose Route Start Last Admin





  Trade Name Freq  PRN Reason Stop Dose Admin


 


Ibuprofen  600 mg  12/15/19 03:10  12/15/19 03:30





  Motrin -  PO  12/15/19 03:11  600 mg





  ONCE ONE   Administration





     





     





     





     














Medical Decision Making





- Medical Decision Making





12/15/19 04:04


39-year-old right-hand-dominant male presenting to the emergency department 

with a complaint of left ring finger pain


He status post operative procedure


Xray performed


No obvious fractures on my review of xray


12/15/19 04:35





X ray:


Referring Physician: BHASKAR HESTER


Patient Name: MARIJA MANCILLA


THIS IS A PRELIMINARY REPORT FROM IMAGING ON CALL


DATE OF SERVICE: 2019-12-15 03:13:13


IMAGES: 2


EXAM: FINGER(S) LEFT


HISTORY: Pain. Prior surgery.


COMPARISON: None.


FINDINGS: There is a fracture of the distal aspect of the middle phalange of 

the fourth finger. No


other fractures identified.


IMPRESSION: Fracture of the distal aspect of the middle phalange of the fourth 

finger.


12/15/19 04:44





Finger splint applied


Pt will be discharged to home (I am not sure and patient is not sure if he had 

a fracture before)


Finger splint applied


Will ask pt to follow up with his hand surgeon ASAP


 





Discharge





- Discharge Information


Problems reviewed: Yes


Clinical Impression/Diagnosis: 


Finger injury


Qualifiers:


 Encounter type: initial encounter Laterality: left Qualified Code(s): S69.92XA 

- Unspecified injury of left wrist, hand and finger(s), initial encounter





Finger fracture, left


Qualifiers:


 Encounter type: initial encounter Finger: ring finger Fracture type: closed 

Phalanx: unspecified phalanx Fracture alignment: nondisplaced Qualified Code(s)

: S62.605A - Fracture of unspecified phalanx of left ring finger, initial 

encounter for closed fracture





Condition: Stable


Disposition: HOME





- Admission


No





- Additional Discharge Information


Prescriptions: 


traMADol HCL [Ultram] 50 mg PO BID PRN #6 tablet MDD 2


 PRN Reason: Severe Pain





- Follow up/Referral


Referrals: 


Caridad Portillo [Primary Care Provider] - 





- Patient Discharge Instructions


Patient Printed Discharge Instructions:  DI for Finger Fracture, DI for Finger 

Sprain


Additional Instructions: 


Mr. Aleksander Miramontes





Thank you for coming in to the ER today


Please be sure to follow up with your Hand Surgeon


Please take Motrin for pain or Tylenol #3


Please wear finger splint as is tolerable for you


Return to the ER for any other concerns or complaint





- Post Discharge Activity

## 2020-12-06 ENCOUNTER — HOSPITAL ENCOUNTER (EMERGENCY)
Dept: HOSPITAL 74 - JER | Age: 40
LOS: 1 days | Discharge: HOME | End: 2020-12-07
Payer: COMMERCIAL

## 2020-12-06 VITALS — HEART RATE: 99 BPM | SYSTOLIC BLOOD PRESSURE: 147 MMHG | TEMPERATURE: 99.8 F | DIASTOLIC BLOOD PRESSURE: 112 MMHG

## 2020-12-06 VITALS — BODY MASS INDEX: 23.3 KG/M2

## 2020-12-06 DIAGNOSIS — Z03.818: Primary | ICD-10-CM

## 2020-12-20 ENCOUNTER — HOSPITAL ENCOUNTER (EMERGENCY)
Dept: HOSPITAL 74 - JER | Age: 40
LOS: 1 days | Discharge: HOME | End: 2020-12-21
Payer: COMMERCIAL

## 2020-12-20 VITALS — BODY MASS INDEX: 27.4 KG/M2

## 2020-12-20 VITALS — TEMPERATURE: 97.8 F | SYSTOLIC BLOOD PRESSURE: 130 MMHG | HEART RATE: 81 BPM | DIASTOLIC BLOOD PRESSURE: 95 MMHG

## 2020-12-20 DIAGNOSIS — R07.9: Primary | ICD-10-CM

## 2020-12-20 PROCEDURE — U0003 INFECTIOUS AGENT DETECTION BY NUCLEIC ACID (DNA OR RNA); SEVERE ACUTE RESPIRATORY SYNDROME CORONAVIRUS 2 (SARS-COV-2) (CORONAVIRUS DISEASE [COVID-19]), AMPLIFIED PROBE TECHNIQUE, MAKING USE OF HIGH THROUGHPUT TECHNOLOGIES AS DESCRIBED BY CMS-2020-01-R: HCPCS

## 2020-12-20 PROCEDURE — C9803 HOPD COVID-19 SPEC COLLECT: HCPCS

## 2020-12-21 LAB
ALBUMIN SERPL-MCNC: 3.9 G/DL (ref 3.4–5)
ALP SERPL-CCNC: 67 U/L (ref 45–117)
ALT SERPL-CCNC: 53 U/L (ref 13–61)
ANION GAP SERPL CALC-SCNC: 5 MMOL/L (ref 8–16)
APPEARANCE UR: CLEAR
APTT BLD: 29.4 SECONDS (ref 25.2–36.5)
AST SERPL-CCNC: 31 U/L (ref 15–37)
BACTERIA # UR AUTO: 15 /UL (ref 0–1359)
BASOPHILS # BLD: 0.6 % (ref 0–2)
BILIRUB CONJ SERPL-MCNC: 0.1 MG/DL (ref 0–0.2)
BILIRUB DIRECT SERPL-MCNC: 182 U/L (ref 87–246)
BILIRUB SERPL-MCNC: 0.6 MG/DL (ref 0.2–1)
BILIRUB UR STRIP.AUTO-MCNC: NEGATIVE MG/DL
BUN SERPL-MCNC: 18.5 MG/DL (ref 7–18)
CALCIUM SERPL-MCNC: 8.8 MG/DL (ref 8.5–10.1)
CASTS URNS QL MICRO: 4 /UL (ref 0–3.1)
CHLORIDE SERPL-SCNC: 106 MMOL/L (ref 98–107)
CO2 SERPL-SCNC: 27 MMOL/L (ref 21–32)
COLOR UR: YELLOW
CREAT SERPL-MCNC: 1.7 MG/DL (ref 0.55–1.3)
DEPRECATED RDW RBC AUTO: 13.2 % (ref 11.9–15.9)
EOSINOPHIL # BLD: 0.8 % (ref 0–4.5)
EPITH CASTS URNS QL MICRO: 11 /UL (ref 0–25.1)
GLUCOSE SERPL-MCNC: 97 MG/DL (ref 74–106)
HCT VFR BLD CALC: 41.6 % (ref 35.4–49)
HGB BLD-MCNC: 14.2 GM/DL (ref 11.7–16.9)
INR BLD: 1.06 (ref 0.83–1.09)
KETONES UR QL STRIP: (no result)
LEUKOCYTE ESTERASE UR QL STRIP.AUTO: NEGATIVE
LYMPHOCYTES # BLD: 23.9 % (ref 8–40)
MCH RBC QN AUTO: 31.6 PG (ref 25.7–33.7)
MCHC RBC AUTO-ENTMCNC: 34 G/DL (ref 32–35.9)
MCV RBC: 92.8 FL (ref 80–96)
MONOCYTES # BLD AUTO: 8.8 % (ref 3.8–10.2)
NEUTROPHILS # BLD: 65.9 % (ref 42.8–82.8)
NITRITE UR QL STRIP: NEGATIVE
PH UR: 6 [PH] (ref 5–8)
PLATELET # BLD AUTO: 328 K/MM3 (ref 134–434)
PMV BLD: 7.8 FL (ref 7.5–11.1)
POTASSIUM SERPLBLD-SCNC: 3.8 MMOL/L (ref 3.5–5.1)
PROT SERPL-MCNC: 7.8 G/DL (ref 6.4–8.2)
PROT UR QL STRIP: (no result)
PROT UR QL STRIP: NEGATIVE
PT PNL PPP: 12.8 SEC (ref 9.7–13)
RBC # BLD AUTO: 2 /UL (ref 0–23.9)
RBC # BLD AUTO: 4.48 M/MM3 (ref 4–5.6)
SODIUM SERPL-SCNC: 138 MMOL/L (ref 136–145)
SP GR UR: 1.02 (ref 1.01–1.03)
UROBILINOGEN UR STRIP-MCNC: 0.2 MG/DL (ref 0.2–1)
WBC # BLD AUTO: 7.4 K/MM3 (ref 4–10)
WBC # UR AUTO: 5 /UL (ref 0–25.8)

## 2021-02-24 ENCOUNTER — HOSPITAL ENCOUNTER (EMERGENCY)
Dept: HOSPITAL 74 - JERFT | Age: 41
Discharge: HOME | End: 2021-02-24
Payer: COMMERCIAL

## 2021-02-24 VITALS — TEMPERATURE: 98.4 F | DIASTOLIC BLOOD PRESSURE: 89 MMHG | SYSTOLIC BLOOD PRESSURE: 126 MMHG | HEART RATE: 75 BPM

## 2021-02-24 VITALS — BODY MASS INDEX: 27.4 KG/M2

## 2021-02-24 DIAGNOSIS — R30.0: Primary | ICD-10-CM

## 2021-02-24 LAB
APPEARANCE UR: (no result)
BILIRUB UR STRIP.AUTO-MCNC: NEGATIVE MG/DL
COLOR UR: YELLOW
KETONES UR QL STRIP: NEGATIVE
LEUKOCYTE ESTERASE UR QL STRIP.AUTO: NEGATIVE
NITRITE UR QL STRIP: NEGATIVE
PH UR: 8.5 [PH] (ref 5–8)
PROT UR QL STRIP: NEGATIVE
PROT UR QL STRIP: NEGATIVE
SP GR UR: 1.02 (ref 1.01–1.03)
UROBILINOGEN UR STRIP-MCNC: 1 MG/DL (ref 0.2–1)

## 2022-05-30 ENCOUNTER — HOSPITAL ENCOUNTER (EMERGENCY)
Dept: HOSPITAL 74 - JER | Age: 42
Discharge: HOME | End: 2022-05-30
Payer: COMMERCIAL

## 2022-05-30 VITALS — HEART RATE: 89 BPM | SYSTOLIC BLOOD PRESSURE: 147 MMHG | DIASTOLIC BLOOD PRESSURE: 89 MMHG | TEMPERATURE: 98.9 F

## 2022-05-30 VITALS — BODY MASS INDEX: 29.9 KG/M2

## 2022-05-30 DIAGNOSIS — R07.0: Primary | ICD-10-CM

## 2022-05-30 PROCEDURE — 3E023GC INTRODUCTION OF OTHER THERAPEUTIC SUBSTANCE INTO MUSCLE, PERCUTANEOUS APPROACH: ICD-10-PCS

## 2022-06-01 ENCOUNTER — HOSPITAL ENCOUNTER (EMERGENCY)
Dept: HOSPITAL 74 - JER | Age: 42
Discharge: HOME | End: 2022-06-01
Payer: COMMERCIAL

## 2022-06-01 VITALS — TEMPERATURE: 98.7 F | SYSTOLIC BLOOD PRESSURE: 123 MMHG | DIASTOLIC BLOOD PRESSURE: 80 MMHG | HEART RATE: 78 BPM

## 2022-06-01 VITALS — BODY MASS INDEX: 29 KG/M2

## 2022-06-01 DIAGNOSIS — B34.9: Primary | ICD-10-CM

## 2022-06-01 LAB
APPEARANCE UR: CLEAR
BILIRUB UR STRIP.AUTO-MCNC: NEGATIVE MG/DL
COLOR UR: YELLOW
KETONES UR QL STRIP: NEGATIVE
LEUKOCYTE ESTERASE UR QL STRIP.AUTO: NEGATIVE
NITRITE UR QL STRIP: NEGATIVE
PH UR: 5.5 [PH] (ref 5–8)
PROT UR QL STRIP: NEGATIVE
PROT UR QL STRIP: NEGATIVE
SP GR UR: 1.01 (ref 1.01–1.03)
UROBILINOGEN UR STRIP-MCNC: 0.2 MG/DL (ref 0.2–1)

## 2022-06-01 PROCEDURE — 3E033GC INTRODUCTION OF OTHER THERAPEUTIC SUBSTANCE INTO PERIPHERAL VEIN, PERCUTANEOUS APPROACH: ICD-10-PCS

## 2022-06-01 PROCEDURE — 3E023GC INTRODUCTION OF OTHER THERAPEUTIC SUBSTANCE INTO MUSCLE, PERCUTANEOUS APPROACH: ICD-10-PCS

## 2022-12-22 ENCOUNTER — HOSPITAL ENCOUNTER (EMERGENCY)
Dept: HOSPITAL 74 - JER | Age: 42
Discharge: HOME | End: 2022-12-22
Payer: COMMERCIAL

## 2022-12-22 VITALS
TEMPERATURE: 98.2 F | DIASTOLIC BLOOD PRESSURE: 93 MMHG | RESPIRATION RATE: 18 BRPM | SYSTOLIC BLOOD PRESSURE: 149 MMHG | HEART RATE: 92 BPM

## 2022-12-22 VITALS — BODY MASS INDEX: 27.4 KG/M2

## 2022-12-22 DIAGNOSIS — J45.901: Primary | ICD-10-CM

## 2022-12-22 PROCEDURE — 3E0F7GC INTRODUCTION OF OTHER THERAPEUTIC SUBSTANCE INTO RESPIRATORY TRACT, VIA NATURAL OR ARTIFICIAL OPENING: ICD-10-PCS | Performed by: EMERGENCY MEDICINE
